# Patient Record
Sex: FEMALE | Race: WHITE | NOT HISPANIC OR LATINO | Employment: FULL TIME | URBAN - METROPOLITAN AREA
[De-identification: names, ages, dates, MRNs, and addresses within clinical notes are randomized per-mention and may not be internally consistent; named-entity substitution may affect disease eponyms.]

---

## 2020-07-10 ENCOUNTER — TRANSCRIBE ORDERS (OUTPATIENT)
Dept: PHYSICAL THERAPY | Facility: CLINIC | Age: 30
End: 2020-07-10

## 2020-07-10 DIAGNOSIS — S46.912A STRAIN OF LEFT SHOULDER, INITIAL ENCOUNTER: Primary | ICD-10-CM

## 2020-07-15 ENCOUNTER — TREATMENT (OUTPATIENT)
Dept: PHYSICAL THERAPY | Facility: CLINIC | Age: 30
End: 2020-07-15

## 2020-07-15 DIAGNOSIS — S46.912D STRAIN OF LEFT SHOULDER, SUBSEQUENT ENCOUNTER: Primary | ICD-10-CM

## 2020-07-15 PROCEDURE — 97110 THERAPEUTIC EXERCISES: CPT | Performed by: PHYSICAL THERAPIST

## 2020-07-15 PROCEDURE — 97161 PT EVAL LOW COMPLEX 20 MIN: CPT | Performed by: PHYSICAL THERAPIST

## 2020-07-15 NOTE — PROGRESS NOTES
Physical Therapy Initial Evaluation and Plan of Care        Subjective Evaluation    History of Present Illness  Date of onset: 2020  Mechanism of injury: Patient took a bin off her desk and was lowering it and felt a knot in the left shoulder.  Patient reports that the shoulder has hurt since.      Patient Occupation: Warehouse   Precautions and Work Restrictions: light dutyPain  Current pain ratin  At worst pain ratin  Location: left upper trap region  Quality: dull ache  Relieving factors: heat and rest  Aggravating factors: lifting and overhead activity  Progression: no change    Hand dominance: right             Objective          Palpation     Additional Palpation Details  TTP to the left UT and medial border of the scapula.    Active Range of Motion   Left Shoulder   Flexion: 143 degrees   Abduction: 141 degrees   External rotation 0°: 87 degrees   Internal rotation BTB: L5     Additional Active Range of Motion Details  Pain with all planes.    Strength/Myotome Testing     Left Shoulder     Planes of Motion   Flexion: 4+   Abduction: 4           Assessment & Plan     Assessment  Impairments: abnormal or restricted ROM, activity intolerance, impaired physical strength, lacks appropriate home exercise program and pain with function  Assessment details: Patient presents with c/o pain, TTP, limited AROM and minimal decrease in strength which is limiting her ability to perform full job duties and ADL'S.  Barriers to therapy: none  Prognosis: good  Prognosis details: STG's  1)  Independent with HEP  2)  Decrease pain by 50% or more  3)  AROM WNL for the left shoulder without pain    LTG's  1)  Independent with HEP progression  2)  Decrease pain by 75% or more  3)  Increase strength for the left shoulder to 4+/5  4)  No TTP present  5)  Patient to lift waist to shlder up to 35 lbs  6)  Patient to return to ADL'S without limitations       Plan  Therapy options: will be seen for skilled physical therapy  services  Planned modality interventions: TENS and thermotherapy (hydrocollator packs)  Planned therapy interventions: strengthening, stretching, therapeutic activities and home exercise program  Treatment plan discussed with: patient        Manual Therapy:    0     mins  59628;  Therapeutic Exercise:    15     mins  08164;      Neuromuscular Maame:    0    mins  59827;    Therapeutic Activity:     0     mins  18486;     Gait Trainin     mins  55237;     Ultrasound:     0     mins  12107;    Work Hardening           0      mins 90415  Iontophoresis               0   mins 61071    Timed Treatment:   15   mins   Total Treatment:     28   mins    PT SIGNATURE: Gregg Caballero, PT   DATE TREATMENT INITIATED: 7/15/2020    Initial Certification  Certification Period: 10/13/2020  I certify that the therapy services are furnished while this patient is under my care.  The services outlined above are required by this patient, and will be reviewed every 90 days.     PHYSICIAN:       DATE:     Please sign and return via fax to [phone].. Thank you, [facility].

## 2020-07-16 ENCOUNTER — TREATMENT (OUTPATIENT)
Dept: PHYSICAL THERAPY | Facility: CLINIC | Age: 30
End: 2020-07-16

## 2020-07-16 DIAGNOSIS — S46.912D STRAIN OF LEFT SHOULDER, SUBSEQUENT ENCOUNTER: Primary | ICD-10-CM

## 2020-07-16 PROCEDURE — 97014 ELECTRIC STIMULATION THERAPY: CPT | Performed by: PHYSICAL THERAPIST

## 2020-07-16 PROCEDURE — 97110 THERAPEUTIC EXERCISES: CPT | Performed by: PHYSICAL THERAPIST

## 2020-07-16 NOTE — PROGRESS NOTES
Physical Therapy Daily Progress Note      Visit # 2      Subjective Evaluation    History of Present Illness    Subjective comment: Pt reports that current pain rating is 6/10.       Objective   See Exercise, Manual, and Modality Logs for complete treatment.   Added Rhomboid stretch, Bent Row    Assessment & Plan     Assessment  Assessment details: Pt tolerated treatment well.  Bent row with (L) UE caused slightly increase pain in (L) upper trap.  Pt responded to heat and E-stim very well.                     Manual Therapy:    0     mins  31578;  Therapeutic Exercise:    20     mins  16731;     Neuromuscular Maame:    0    mins  18644;    Therapeutic Activity:     0     mins  13904;     Gait Trainin     mins  13987;     Ultrasound:     0     mins  92611;    Work Hardening           0      mins 09331  Iontophoresis               0   mins 10107    Timed Treatment:   20   mins   Total Treatment:     35   mins    Janes Garcia PTA  Physical Therapist

## 2020-07-17 ENCOUNTER — TREATMENT (OUTPATIENT)
Dept: PHYSICAL THERAPY | Facility: CLINIC | Age: 30
End: 2020-07-17

## 2020-07-17 DIAGNOSIS — S46.912D STRAIN OF LEFT SHOULDER, SUBSEQUENT ENCOUNTER: Primary | ICD-10-CM

## 2020-07-17 PROCEDURE — 97110 THERAPEUTIC EXERCISES: CPT | Performed by: PHYSICAL THERAPIST

## 2020-07-17 PROCEDURE — 97014 ELECTRIC STIMULATION THERAPY: CPT | Performed by: PHYSICAL THERAPIST

## 2020-07-17 NOTE — PROGRESS NOTES
Physical Therapy Daily Progress Note      Visit # 3      Subjective Evaluation    History of Present Illness    Subjective comment: Pt reports that her current pain rating is 3/10.       Objective   See Exercise, Manual, and Modality Logs for complete treatment.       Assessment & Plan     Assessment  Assessment details: Pt tolerated treatment well.  She had no issues with the progression of reps and wt on her exercises.  She experienced a popping sensation in her (L) shoulder during the eccentric phase of Db rows. After discussing the importance of keeping the scapula retracted she was able to complete the exercises with no further problems.                      Manual Therapy:    0     mins  68598;  Therapeutic Exercise:    25     mins  38304;     Neuromuscular Maame:    0    mins  43823;    Therapeutic Activity:     0     mins  21831;     Gait Trainin     mins  84666;     Ultrasound:     0     mins  70466;    Work Hardening           0      mins 99759  Iontophoresis               0   mins 76391    Timed Treatment:   25   mins   Total Treatment:     45   mins    Janes Garcia PTA  Physical Therapist

## 2020-08-03 ENCOUNTER — TREATMENT (OUTPATIENT)
Dept: PHYSICAL THERAPY | Facility: CLINIC | Age: 30
End: 2020-08-03

## 2020-08-03 DIAGNOSIS — S46.912D STRAIN OF LEFT SHOULDER, SUBSEQUENT ENCOUNTER: Primary | ICD-10-CM

## 2020-08-03 PROCEDURE — 97110 THERAPEUTIC EXERCISES: CPT | Performed by: PHYSICAL THERAPIST

## 2020-08-03 NOTE — PROGRESS NOTES
Physical Therapy Daily Progress Note      Visit # 4      Subjective Evaluation    History of Present Illness    Subjective comment: Pt reports that current pain rating is 2/10.       Objective          Active Range of Motion     Right Shoulder   Flexion: 143 degrees with pain  Abduction: 135 degrees with pain    Strength/Myotome Testing     Right Shoulder     Planes of Motion   Flexion: 5   Abduction: 5   External rotation at 0°: 5   Internal rotation at 0°: 5       See Exercise, Manual, and Modality Logs for complete treatment.       Assessment & Plan     Assessment  Assessment details: Pt completed treatment without significant c/o pain. She is able to complete her prescribed exercises with only vc to initiate, and displays good form.  All (L) shoulder motions are painful, although overall daily pain level has decreased.      Goals  Plan Goals: Prognosis details: STG's  1)  Independent with HEP- Met  2)  Decrease pain by 50% or more-Met  3)  AROM WNL for the left shoulder without pain- Not met    LTG's  1)  Independent with HEP progression-Met  2)  Decrease pain by 75% or more- not met  3)  Increase strength for the left shoulder to 4+/5- Met  4)  No TTP present- Not met  5)  Patient to lift waist to shlder up to 35 lbs  6)  Patient to return to ADL'S without limitations - not met                     Manual Therapy:    0     mins  04707;  Therapeutic Exercise:    15     mins  61469;     Neuromuscular Maame:    0    mins  46939;    Therapeutic Activity:     0     mins  46628;     Gait Trainin     mins  21597;     Ultrasound:     0     mins  84513;    Work Hardening           0      mins 24532  Iontophoresis               0   mins 74842    Timed Treatment:   15   mins   Total Treatment:     15   mins    Janes Garcia PTA  Physical Therapist

## 2020-08-25 ENCOUNTER — DOCUMENTATION (OUTPATIENT)
Dept: PHYSICAL THERAPY | Facility: CLINIC | Age: 30
End: 2020-08-25